# Patient Record
Sex: MALE | Race: WHITE | NOT HISPANIC OR LATINO
[De-identification: names, ages, dates, MRNs, and addresses within clinical notes are randomized per-mention and may not be internally consistent; named-entity substitution may affect disease eponyms.]

---

## 2017-09-19 ENCOUNTER — RESULT REVIEW (OUTPATIENT)
Age: 82
End: 2017-09-19

## 2019-06-07 ENCOUNTER — EMERGENCY (EMERGENCY)
Facility: HOSPITAL | Age: 84
LOS: 1 days | Discharge: HOME | End: 2019-06-07
Attending: EMERGENCY MEDICINE | Admitting: EMERGENCY MEDICINE
Payer: COMMERCIAL

## 2019-06-07 VITALS
DIASTOLIC BLOOD PRESSURE: 78 MMHG | SYSTOLIC BLOOD PRESSURE: 152 MMHG | OXYGEN SATURATION: 100 % | HEART RATE: 78 BPM | RESPIRATION RATE: 18 BRPM | TEMPERATURE: 98 F

## 2019-06-07 VITALS
HEART RATE: 64 BPM | TEMPERATURE: 98 F | WEIGHT: 154.98 LBS | OXYGEN SATURATION: 96 % | SYSTOLIC BLOOD PRESSURE: 144 MMHG | RESPIRATION RATE: 18 BRPM | DIASTOLIC BLOOD PRESSURE: 74 MMHG

## 2019-06-07 PROCEDURE — 99285 EMERGENCY DEPT VISIT HI MDM: CPT

## 2019-06-07 PROCEDURE — 93010 ELECTROCARDIOGRAM REPORT: CPT

## 2019-06-07 PROCEDURE — 99284 EMERGENCY DEPT VISIT MOD MDM: CPT | Mod: 25

## 2019-06-07 PROCEDURE — 93005 ELECTROCARDIOGRAM TRACING: CPT

## 2019-06-07 PROCEDURE — 72125 CT NECK SPINE W/O DYE: CPT | Mod: 26

## 2019-06-07 PROCEDURE — 70450 CT HEAD/BRAIN W/O DYE: CPT | Mod: 26

## 2019-06-07 PROCEDURE — 70450 CT HEAD/BRAIN W/O DYE: CPT

## 2019-06-07 PROCEDURE — 72125 CT NECK SPINE W/O DYE: CPT

## 2019-06-07 RX ORDER — TIMOLOL 0.5 %
0 DROPS OPHTHALMIC (EYE)
Qty: 0 | Refills: 0 | DISCHARGE

## 2019-06-07 RX ORDER — ROSUVASTATIN CALCIUM 5 MG/1
0 TABLET ORAL
Qty: 0 | Refills: 0 | DISCHARGE

## 2019-06-07 NOTE — ED PROVIDER NOTE - OBJECTIVE STATEMENT
pt is an 82 yo male who has hx of cad sp cabgx3 mii elev chol afib on coumadin htn was walking outside and tripped on curb 6 days ago.  he  fell hit face, hands and scraped left knee. he does not recall the event, denies passing out and recalls "wakign up " iwht the abrasions on his hand.   3 days later he went to see his pmd dr Aviva Arriaza at [facility]. the pa there eval pt and informed him that he needed to go to er for evaluation and provided an rx fo rct scan.  an ekg was done at that time as well .  data also reveals (on review of chart from pmd) hx of htn chf spinal stenosis bph

## 2019-06-07 NOTE — ED PROVIDER NOTE - CLINICAL SUMMARY MEDICAL DECISION MAKING FREE TEXT BOX
ct head on coumadin after trip and fall has been well since ro ich ro fx ekg done at pmd will repeat for arrhythmia no chages here

## 2019-06-07 NOTE — ED PROVIDER NOTE - MUSCULOSKELETAL, MLM
Spine appears normal, range of motion is not limited, no muscle or joint tenderness full rom no bony crepitance or deformity. there are abrasions to the thenar eminance to both hands and left knee no pain on rom full rom al

## 2019-06-07 NOTE — ED ADULT NURSE NOTE - OBJECTIVE STATEMENT
Sent by PMD. Present to ER with c/o of fall 3 days ago. Pt states he fell and does not remember how he fall. Pt states his nose were bleeding at that time and c/o of arm and knee pain. Pt is on coumadin for afib. Denies any chest pain or shortness of breath.

## 2019-06-07 NOTE — ED ADULT TRIAGE NOTE - CHIEF COMPLAINT QUOTE
Pt reports that he fell last Sunday, and doesn't remember how he fell, pt is concerned because he doesn't remember it happening, denies pain, thinks that he hit his face because he has a bruise on his forehead and nose.

## 2019-06-07 NOTE — ED PROVIDER NOTE - NSFOLLOWUPINSTRUCTIONS_ED_ALL_ED_FT
follow up with dr Arriaza  activity as tolerated  no alcohol or sedatives  if you have fever weakness numbness or any concerns worsening symptoms return to emergency

## 2019-06-07 NOTE — ED PROVIDER NOTE - CARE PROVIDER_API CALL
Aviva Arriaza)  Internal Medicine  [address] [place] [id number]  Phone: [phone]  Fax: [phone]  Follow Up Time:

## 2019-06-07 NOTE — ED PROVIDER NOTE - ENMT, MLM
Airway patent, Nasal mucosa clear. Mouth with normal mucosa. Throat has no vesicles, no oropharyngeal exudates and uvula is midline. there is an aging bruise on his r forehead and over the bridge of nose no bleeding there is also a contusion to the upper lip

## 2019-06-07 NOTE — ED ADULT NURSE NOTE - NSIMPLEMENTINTERV_GEN_ALL_ED
Implemented All Fall Risk Interventions:  [place] to call system. Call bell, personal items and telephone within reach. Instruct patient to call for assistance. Room bathroom lighting operational. Non-slip footwear when patient is off stretcher. Physically safe environment: no spills, clutter or unnecessary equipment. Stretcher in lowest position, wheels locked, appropriate side rails in place. Provide visual cue, wrist band, yellow gown, etc. Monitor gait and stability. Monitor for mental status changes and reorient to person, place, and time. Review medications for side effects contributing to fall risk. Reinforce activity limits and safety measures with patient and family.

## 2025-01-07 ENCOUNTER — OUTPATIENT (OUTPATIENT)
Dept: OUTPATIENT SERVICES | Facility: HOSPITAL | Age: 89
LOS: 1 days | End: 2025-01-07
Payer: MEDICARE

## 2025-01-07 ENCOUNTER — RESULT REVIEW (OUTPATIENT)
Age: 89
End: 2025-01-07

## 2025-01-07 DIAGNOSIS — R06.09 OTHER FORMS OF DYSPNEA: ICD-10-CM

## 2025-01-07 DIAGNOSIS — R07.89 OTHER CHEST PAIN: ICD-10-CM

## 2025-01-07 PROCEDURE — 93017 CV STRESS TEST TRACING ONLY: CPT

## 2025-01-07 PROCEDURE — 93018 CV STRESS TEST I&R ONLY: CPT | Mod: MC

## 2025-01-07 PROCEDURE — 78452 HT MUSCLE IMAGE SPECT MULT: CPT | Mod: MC

## 2025-01-07 PROCEDURE — 78452 HT MUSCLE IMAGE SPECT MULT: CPT | Mod: 26,MC

## 2025-01-07 PROCEDURE — A9500: CPT

## 2025-01-07 PROCEDURE — 93016 CV STRESS TEST SUPVJ ONLY: CPT | Mod: MC

## 2025-02-19 VITALS
OXYGEN SATURATION: 99 % | WEIGHT: 141.1 LBS | HEIGHT: 67 IN | TEMPERATURE: 98 F | RESPIRATION RATE: 19 BRPM | DIASTOLIC BLOOD PRESSURE: 82 MMHG | HEART RATE: 80 BPM | SYSTOLIC BLOOD PRESSURE: 154 MMHG

## 2025-02-19 NOTE — H&P CARDIOLOGY - NSICDXPASTMEDICALHX_GEN_ALL_CORE_FT
PAST MEDICAL HISTORY:  Atrial fibrillation     CAD (coronary artery disease)     Dyslipidemia     Hypertension

## 2025-02-19 NOTE — H&P CARDIOLOGY - HISTORY OF PRESENT ILLNESS
----------------------------------------  History:       S/P PCI and S/P CABG. 88 yo male with pmhx HLD, CAD s/p stents,                 CABG, Afib with c/p chest pain and sob.  Risk Factors:  Dyslipidemia.  Image Quality: Fair  Artifact:      Patient motion and diaphragm.  Medications:   Coumadin, crestor, namenda, proscar.  NDC Number(s): 27373-263-07  Allergies:     None    --------------------------------------------------------------------------------------------------------------------------------------------------------Conclusions:   1. Normal myocardial perfusion scan, with no evidence of infarction or inducible ischemia. No TID (ratio 0.97).   2. The left ventricle is normal in function and normal in size. The post stress left ventricular EF is 54 %. The stress end diastolic volume is 106 ml and systolic volume is 48 ml.   3. The patient underwent stress testing using pharmacological IV regadenoson (bolus injection, 400mcg over 10 to 20 seconds followed by 5ml flush) protocol.      _ The total procedure time was 4 minutes . The test was stopped due to completion of protocol.      _ The peak heart rate was 92 bpm: 69 % of the patient's predicted maximal heart rate. The heart rate response was normal pharmocologic heart rate response.      _ There was a normal pharmocologic blood pressure response with a maximum blood pressure of 168/84 mmHg.   4. Normal pharmocologic heart rate response.   5. Normal pharmocologic blood pressure response.   6. Baseline electrocardiogram: atrial fibrillation at a rate of 60 bpm with occasional premature atrial contractions and nonspecific ST-T wave abnormalities.   7. Arrhythmias: Occasional APD's occurred during rest, stress and recovery, was unaffected by stress.   8. Stress electrocardiogram: No significant ischemic ST segment changes beyond baseline abnormalities.   This is an 89 year old gentleman with a PMH of AF on coumadin, CAD requiring PCI / stents and CABG. HTN, HLD, referred by Dr Shagufta Guardado for a LHC with Dr Gema Mills secondary to increasing SOB & CP. Currently the pt denies any c/o CP, SOB or palpitations and is in no distress.    PRE-PROCEDURE ASSESSMENT  -NPO after midnight confirmed  -Last dose of coumadin was on  -IV insert  -Precath fluid hydration ordered  -Patient seen and examined  -Labs reviewed  -Pre-procedure teaching completed with patient   -Consent obtained by Interventional Cardiologist  -Questions answered      NST 01/07/25  --------------------------------------------------------------------------------------------------------------------------------------------------------Conclusions:   1. Normal myocardial perfusion scan, with no evidence of infarction or inducible ischemia. No TID (ratio 0.97).   2. The left ventricle is normal in function and normal in size. The post stress left ventricular EF is 54 %. The stress end diastolic volume is 106 ml and systolic volume is 48 ml.   3. The patient underwent stress testing using pharmacological IV regadenoson (bolus injection, 400mcg over 10 to 20 seconds followed by 5ml flush) protocol.      _ The total procedure time was 4 minutes . The test was stopped due to completion of protocol.      _ The peak heart rate was 92 bpm: 69 % of the patient's predicted maximal heart rate. The heart rate response was normal pharmocologic heart rate response.      _ There was a normal pharmocologic blood pressure response with a maximum blood pressure of 168/84 mmHg.   4. Normal pharmocologic heart rate response.   5. Normal pharmocologic blood pressure response.   6. Baseline electrocardiogram: atrial fibrillation at a rate of 60 bpm with occasional premature atrial contractions and nonspecific ST-T wave abnormalities.   7. Arrhythmias: Occasional APD's occurred during rest, stress and recovery, was unaffected by stress.   8. Stress electrocardiogram: No significant ischemic ST segment changes beyond baseline abnormalities.   This is an 89 year old gentleman with a PMH of AF on coumadin, CAD requiring PCI / stents and CABG. HTN, HLD, referred by Dr Shagufta Guardado for a LHC with Dr Gema Mills secondary to increasing SOB & CP. Currently the pt denies any c/o CP, SOB or palpitations and is in no distress. Patient describes chest pain as a pressure sensation accomapnied by exertional dyspnea when in a stressful situation or with strenuous physical activity.     PRE-PROCEDURE ASSESSMENT  -NPO after midnight confirmed  -Last dose of coumadin was on 02/16/2025  -IV insert  -Precath fluid hydration ordered  -Patient seen and examined  -Labs reviewed  -Pre-procedure teaching completed with patient   -Consent obtained by Interventional Cardiologist  -Questions answered      NST 01/07/25  --------------------------------------------------------------------------------------------------------------------------------------------------------Conclusions:   1. Normal myocardial perfusion scan, with no evidence of infarction or inducible ischemia. No TID (ratio 0.97).   2. The left ventricle is normal in function and normal in size. The post stress left ventricular EF is 54 %. The stress end diastolic volume is 106 ml and systolic volume is 48 ml.   3. The patient underwent stress testing using pharmacological IV regadenoson (bolus injection, 400mcg over 10 to 20 seconds followed by 5ml flush) protocol.      _ The total procedure time was 4 minutes . The test was stopped due to completion of protocol.      _ The peak heart rate was 92 bpm: 69 % of the patient's predicted maximal heart rate. The heart rate response was normal pharmocologic heart rate response.      _ There was a normal pharmocologic blood pressure response with a maximum blood pressure of 168/84 mmHg.   4. Normal pharmocologic heart rate response.   5. Normal pharmocologic blood pressure response.   6. Baseline electrocardiogram: atrial fibrillation at a rate of 60 bpm with occasional premature atrial contractions and nonspecific ST-T wave abnormalities.   7. Arrhythmias: Occasional APD's occurred during rest, stress and recovery, was unaffected by stress.   8. Stress electrocardiogram: No significant ischemic ST segment changes beyond baseline abnormalities.

## 2025-02-20 ENCOUNTER — TRANSCRIPTION ENCOUNTER (OUTPATIENT)
Age: 89
End: 2025-02-20

## 2025-02-20 ENCOUNTER — OUTPATIENT (OUTPATIENT)
Dept: OUTPATIENT SERVICES | Facility: HOSPITAL | Age: 89
LOS: 1 days | End: 2025-02-20
Payer: COMMERCIAL

## 2025-02-20 VITALS
SYSTOLIC BLOOD PRESSURE: 141 MMHG | DIASTOLIC BLOOD PRESSURE: 66 MMHG | OXYGEN SATURATION: 98 % | RESPIRATION RATE: 17 BRPM | HEART RATE: 56 BPM

## 2025-02-20 DIAGNOSIS — I25.10 ATHEROSCLEROTIC HEART DISEASE OF NATIVE CORONARY ARTERY WITHOUT ANGINA PECTORIS: ICD-10-CM

## 2025-02-20 LAB
ANION GAP SERPL CALC-SCNC: 4 MMOL/L — LOW (ref 5–17)
BUN SERPL-MCNC: 18 MG/DL — SIGNIFICANT CHANGE UP (ref 7–23)
CALCIUM SERPL-MCNC: 9.7 MG/DL — SIGNIFICANT CHANGE UP (ref 8.5–10.1)
CHLORIDE SERPL-SCNC: 106 MMOL/L — SIGNIFICANT CHANGE UP (ref 96–108)
CO2 SERPL-SCNC: 29 MMOL/L — SIGNIFICANT CHANGE UP (ref 22–31)
CREAT SERPL-MCNC: 1.1 MG/DL — SIGNIFICANT CHANGE UP (ref 0.5–1.3)
EGFR: 65 ML/MIN/1.73M2 — SIGNIFICANT CHANGE UP
GLUCOSE SERPL-MCNC: 93 MG/DL — SIGNIFICANT CHANGE UP (ref 70–99)
HCT VFR BLD CALC: 44.3 % — SIGNIFICANT CHANGE UP (ref 39–50)
HGB BLD-MCNC: 14.8 G/DL — SIGNIFICANT CHANGE UP (ref 13–17)
INR BLD: 1.98 RATIO — HIGH (ref 0.85–1.16)
MCHC RBC-ENTMCNC: 31.1 PG — SIGNIFICANT CHANGE UP (ref 27–34)
MCHC RBC-ENTMCNC: 33.4 G/DL — SIGNIFICANT CHANGE UP (ref 32–36)
MCV RBC AUTO: 93.1 FL — SIGNIFICANT CHANGE UP (ref 80–100)
NRBC BLD AUTO-RTO: 0 /100 WBCS — SIGNIFICANT CHANGE UP (ref 0–0)
PLATELET # BLD AUTO: 131 K/UL — LOW (ref 150–400)
POTASSIUM SERPL-MCNC: 3.9 MMOL/L — SIGNIFICANT CHANGE UP (ref 3.5–5.3)
POTASSIUM SERPL-SCNC: 3.9 MMOL/L — SIGNIFICANT CHANGE UP (ref 3.5–5.3)
PROTHROM AB SERPL-ACNC: 23 SEC — HIGH (ref 9.9–13.4)
RBC # BLD: 4.76 M/UL — SIGNIFICANT CHANGE UP (ref 4.2–5.8)
RBC # FLD: 13.2 % — SIGNIFICANT CHANGE UP (ref 10.3–14.5)
SODIUM SERPL-SCNC: 139 MMOL/L — SIGNIFICANT CHANGE UP (ref 135–145)
WBC # BLD: 3.9 K/UL — SIGNIFICANT CHANGE UP (ref 3.8–10.5)
WBC # FLD AUTO: 3.9 K/UL — SIGNIFICANT CHANGE UP (ref 3.8–10.5)

## 2025-02-20 PROCEDURE — C1760: CPT

## 2025-02-20 PROCEDURE — 85610 PROTHROMBIN TIME: CPT

## 2025-02-20 PROCEDURE — 36415 COLL VENOUS BLD VENIPUNCTURE: CPT

## 2025-02-20 PROCEDURE — C1894: CPT

## 2025-02-20 PROCEDURE — 93459 L HRT ART/GRFT ANGIO: CPT

## 2025-02-20 PROCEDURE — C1887: CPT

## 2025-02-20 PROCEDURE — 80048 BASIC METABOLIC PNL TOTAL CA: CPT

## 2025-02-20 PROCEDURE — 93010 ELECTROCARDIOGRAM REPORT: CPT

## 2025-02-20 PROCEDURE — C1769: CPT

## 2025-02-20 PROCEDURE — 85027 COMPLETE CBC AUTOMATED: CPT

## 2025-02-20 PROCEDURE — 93005 ELECTROCARDIOGRAM TRACING: CPT

## 2025-02-20 PROCEDURE — 93459 L HRT ART/GRFT ANGIO: CPT | Mod: 26

## 2025-02-20 PROCEDURE — 99152 MOD SED SAME PHYS/QHP 5/>YRS: CPT

## 2025-02-20 RX ORDER — MEMANTINE HYDROCHLORIDE 21 MG/1
1 CAPSULE, EXTENDED RELEASE ORAL
Refills: 0 | DISCHARGE

## 2025-02-20 RX ORDER — FINASTERIDE 1 MG/1
1 TABLET, FILM COATED ORAL
Refills: 0 | DISCHARGE

## 2025-02-20 RX ORDER — ROSUVASTATIN CALCIUM 20 MG/1
1 TABLET, FILM COATED ORAL
Refills: 0 | DISCHARGE

## 2025-02-20 NOTE — ASU DISCHARGE PLAN (ADULT/PEDIATRIC) - CARE PROVIDER_API CALL
Shagufta Guardado  Cardiology  [address] [place] [id number]  Phone: [phone]  Fax: [phone]  Follow Up Time: 2 weeks

## 2025-02-20 NOTE — CHART NOTE - NSCHARTNOTEFT_GEN_A_CORE
Post Diagnostic Cardiac Catheterization Chart Note      Prelim cath report:   LHC via RFA  LIMA to mLAD & SVG to OM1 patent 2 V CABG grafts  %    No changes from prior studies  LVEDP-15  full/official report to follow      Patient without complaints. Denies CP, SOB, palpitations, N/V, fever/chills, abd pain, numbness/tingling/weakness, other c/o at this time.    A+O x 3, neurologically intact  RFA access site stable (clean, dry, intact, without bleeding, heat, erythema, or hematoma).   RLE motor, neuro, circ intact.  Hemodynamically stable, neurologically intact, VS stable, afebrile    A/P: s/p diagnostic LHC via RFA  non-obstructive CAD: medical mgmt   post cath access site precautions reviewed with pt who verbalized good understanding  resume coumadin this evening -check INR Saturday  d/c home once post cath recovery completed / discharge criteria met and wrist / hemodynamics remain stable  see discharge for instructions/plan

## 2025-02-20 NOTE — ASU DISCHARGE PLAN (ADULT/PEDIATRIC) - PROVIDER TOKENS
PROVIDER:[TOKEN:[[id number]],FOLLOWUP:[2 weeks]] This was a shared visit with the LEI. I reviewed and verified the documentation and independently performed the documented:

## 2025-02-20 NOTE — ASU DISCHARGE PLAN (ADULT/PEDIATRIC) - NS MD DC FALL RISK RISK
For information on Fall & Injury Prevention, visit: [email or web address] OR  [email or web address] OR  https://www.cdc.gov/steadi/patient.html

## 2025-02-20 NOTE — ASU DISCHARGE PLAN (ADULT/PEDIATRIC) - ASU DC SPECIAL INSTRUCTIONSFT
- Bruising at the groin, sometimes extending down the leg, and/or a small lump under the skin at the groin access site is normal and will resolve within 2 – 3 weeks.   - Occasional skipped beats or palpitations that last for a few beats are common and generally resolve within 1-2 months.   - You may walk and take stairs at a regular pace.   - Do not perform any exercise more strenuous than walking for 1 week.   - Do not strain or lift heavy objects for 1 week.  - You may shower the day after the procedure.  - Do not soak in water (such as tub baths, hot tubs, swimming, etc.) for 1 week.   - You may resume all other activities the day after the procedure.  Call your doctor if:   - you notice bleeding, redness, drainage, swelling, increased tenderness or a hot sensation around the catheter insertion site.   - your temperature is greater than 100 degrees F for more than 24 hours.  - your rapid heart rhythm returns.  - you have any questions or concerns regarding the procedure.  If significant bleeding and/or a large lump (the size of a golf ball or bigger) occurs:  - Lie flat and apply continuous direct pressure just above the puncture site for at least 10 minutes  - If the issue resolves, notify your physician immediately.    - If the bleeding cannot be controlled, please seek immediate medical attention.  If you experience increased difficulty breathing or chest pain, or if you faint or have dizzy spells, please seek immediate medical attention. sustained

## 2025-02-20 NOTE — ASU DISCHARGE PLAN (ADULT/PEDIATRIC) - FINANCIAL ASSISTANCE
[facility] provides services at a reduced cost to those who are determined to be eligible through [facility]’s financial assistance program. Information regarding [facility]’s financial assistance program can be found by going to [email or web address] or by calling [phone].